# Patient Record
Sex: FEMALE | Race: WHITE | NOT HISPANIC OR LATINO | Employment: UNEMPLOYED | URBAN - METROPOLITAN AREA
[De-identification: names, ages, dates, MRNs, and addresses within clinical notes are randomized per-mention and may not be internally consistent; named-entity substitution may affect disease eponyms.]

---

## 2024-06-20 ENCOUNTER — HOSPITAL ENCOUNTER (EMERGENCY)
Facility: HOSPITAL | Age: 49
Discharge: HOME | End: 2024-06-20
Attending: EMERGENCY MEDICINE
Payer: MEDICAID

## 2024-06-20 VITALS
RESPIRATION RATE: 20 BRPM | OXYGEN SATURATION: 96 % | HEART RATE: 73 BPM | SYSTOLIC BLOOD PRESSURE: 136 MMHG | TEMPERATURE: 99 F | DIASTOLIC BLOOD PRESSURE: 82 MMHG

## 2024-06-20 DIAGNOSIS — L50.9 URTICARIA: Primary | ICD-10-CM

## 2024-06-20 PROCEDURE — 99282 EMERGENCY DEPT VISIT SF MDM: CPT

## 2024-06-20 PROCEDURE — 99284 EMERGENCY DEPT VISIT MOD MDM: CPT

## 2024-06-20 RX ORDER — PREDNISONE 20 MG/1
40 TABLET ORAL ONCE
Status: COMPLETED | OUTPATIENT
Start: 2024-06-20 | End: 2024-06-20

## 2024-06-20 RX ADMIN — PREDNISONE 40 MG: 20 TABLET ORAL at 18:48

## 2024-06-20 NOTE — DISCHARGE INSTRUCTIONS
Please take 40 mg prednsione (4 tablets at once) for the next 4 days.  Please follow-up with primary care provider. Return to the Emergency Department if you experience rashes, difficulty breathing or swallowing, lip/mouth/tongue swelling, vomiting, or for any other concerning symptoms.

## 2024-06-20 NOTE — ED PROVIDER NOTES
History  Chief Complaint   Patient presents with    Allergic Reaction     Pt has a moderate allergic reaction to lidocaine with hives. Pt went to urgent care and they gave her prednisone but she is refusing to take it due to her allergies. Pt has hives from her neck down to her legs.     Patient is a 49-year-old female presenting to the emergency department for an evaluation of a rash. Patient reports that 3 days ago she went for a walk on a trail and subsequently developed sunburns on her chest, arms and lower legs.  To alleviate the discomfort, she applied an aloe vera gel that she later realized contained lidocaine, to which she is allergic to. Yesterday, she began to develop hives in the area where the gel was applied. The patient visited urgent care where she was prescribed prednisone however she is apprehensive about taking the medication due to concerns about her history of drug allergies.  She has no known allergies to prednisone. Has been tolerating PO normally. She denies any shortness of breath, chest pain, chest tightness, throat tightness or difficulty swallowing.       Allergic Reaction  Presenting symptoms: rash (hives, present on chest, upper and lower extremities)    Presenting symptoms: no difficulty swallowing and no wheezing        None       History reviewed. No pertinent past medical history.    History reviewed. No pertinent surgical history.    History reviewed. No pertinent family history.  I have reviewed and agree with the history as documented.    E-Cigarette/Vaping     E-Cigarette/Vaping Substances     Social History     Tobacco Use    Smoking status: Never    Smokeless tobacco: Never   Substance Use Topics    Alcohol use: Never    Drug use: Never       Review of Systems   Constitutional:  Negative for chills and fever.   HENT:  Negative for drooling, facial swelling, sore throat, trouble swallowing and voice change.    Eyes:  Negative for itching.   Respiratory:  Negative for cough,  choking, chest tightness, shortness of breath, wheezing and stridor.    Cardiovascular:  Negative for chest pain and palpitations.   Gastrointestinal:  Negative for abdominal pain, nausea and vomiting.   Skin:  Positive for color change (Sunburned on chest, arms and lower extremities) and rash (hives, present on chest, upper and lower extremities).   Neurological:  Negative for dizziness, weakness, light-headedness and numbness.   All other systems reviewed and are negative.      Physical Exam  Physical Exam  Vitals and nursing note reviewed.   Constitutional:       General: She is not in acute distress.     Appearance: Normal appearance. She is well-developed and normal weight. She is not ill-appearing or toxic-appearing.   HENT:      Head: Normocephalic and atraumatic.      Mouth/Throat:      Mouth: Mucous membranes are moist.      Pharynx: Oropharynx is clear. No posterior oropharyngeal erythema.   Eyes:      Conjunctiva/sclera: Conjunctivae normal.   Cardiovascular:      Rate and Rhythm: Normal rate and regular rhythm.   Pulmonary:      Effort: Pulmonary effort is normal. No respiratory distress.      Breath sounds: Normal breath sounds. No wheezing.   Musculoskeletal:         General: No swelling. Normal range of motion.      Cervical back: Normal range of motion and neck supple.   Skin:     General: Skin is warm and dry.      Capillary Refill: Capillary refill takes less than 2 seconds.      Findings: Rash present.      Comments: Erythematous, raised, itchy hives present on the chest, arms and lower legs.  Areas of sunburn with mild erythema.  No peeling noted on the same regions.  No involvement of mucous membranes.  No involvement of the soles of the feet or palms.   Neurological:      Mental Status: She is alert.   Psychiatric:         Mood and Affect: Mood normal.                   Vital Signs  ED Triage Vitals [06/20/24 1823]   Temperature Pulse Respirations Blood Pressure SpO2   99 °F (37.2 °C) 73 20  136/82 96 %      Temp Source Heart Rate Source Patient Position - Orthostatic VS BP Location FiO2 (%)   Oral Monitor -- Right arm --      Pain Score       No Pain           Vitals:    06/20/24 1823   BP: 136/82   Pulse: 73         Visual Acuity      ED Medications  Medications   predniSONE tablet 40 mg (40 mg Oral Given 6/20/24 1848)       Diagnostic Studies  Results Reviewed       None                   No orders to display              Procedures  Procedures         ED Course                               SBIRT 20yo+      Flowsheet Row Most Recent Value   Initial Alcohol Screen: US AUDIT-C     1. How often do you have a drink containing alcohol? 0 Filed at: 06/20/2024 1828   2. How many drinks containing alcohol do you have on a typical day you are drinking?  0 Filed at: 06/20/2024 1828   3a. Male UNDER 65: How often do you have five or more drinks on one occasion? 0 Filed at: 06/20/2024 1828   3b. FEMALE Any Age, or MALE 65+: How often do you have 4 or more drinks on one occassion? 0 Filed at: 06/20/2024 1828   Audit-C Score 0 Filed at: 06/20/2024 1828   CARA: How many times in the past year have you...    Used an illegal drug or used a prescription medication for non-medical reasons? Never Filed at: 06/20/2024 1828                      Medical Decision Making  Stable vital signs, with no signs of acute respiratory distress, 96% on RA. The patient's presentation is consistent with an contact dermatitis/allergic reaction to lidocaine containing aloe vera gel, exacerbated by underlying sunburn. The patient's hesitance about taking prednisone due to concerns about drug allergies was addressed and reassurance was provided.  Will give first dosage of prednisone in the ED. Advised patient to start prednisone 40 mg for the next 4 to 5 days. Skin care and follow-up with PCP was discussed.  Return precautions verbalized as well as written in the AVS with full understanding. Patient has no follow-up questions. Patient case  was also discussed with attending, Dr. Nunez, who saw patient at bedside and is agreeable to patient plan and disposition.             Disposition  Final diagnoses:   Urticaria     Time reflects when diagnosis was documented in both MDM as applicable and the Disposition within this note       Time User Action Codes Description Comment    6/20/2024  6:51 PM Divina Leslie Add [L50.9] Urticaria           ED Disposition       ED Disposition   Discharge    Condition   Stable    Date/Time   Thu Jun 20, 2024 1851    Comment   Renu Lugo discharge to home/self care.                   Follow-up Information       Follow up With Specialties Details Why Contact Info Additional Information     [facility] Emergency Department Emergency Medicine Go to  If symptoms worsen [address] [facility] [id number] [phone] [facility] Emergency Department, [address], [place] [address]            There are no discharge medications for this patient.      No discharge procedures on file.    PDMP Review       None            ED Provider  Electronically Signed by             Divina Leslie PA-C  06/20/24 1952